# Patient Record
Sex: MALE | Race: WHITE | NOT HISPANIC OR LATINO
[De-identification: names, ages, dates, MRNs, and addresses within clinical notes are randomized per-mention and may not be internally consistent; named-entity substitution may affect disease eponyms.]

---

## 2020-12-11 ENCOUNTER — TRANSCRIPTION ENCOUNTER (OUTPATIENT)
Age: 70
End: 2020-12-11

## 2020-12-12 ENCOUNTER — APPOINTMENT (OUTPATIENT)
Dept: DISASTER EMERGENCY | Facility: HOSPITAL | Age: 70
End: 2020-12-12

## 2020-12-13 ENCOUNTER — TRANSCRIPTION ENCOUNTER (OUTPATIENT)
Age: 70
End: 2020-12-13

## 2020-12-14 PROBLEM — Z00.00 ENCOUNTER FOR PREVENTIVE HEALTH EXAMINATION: Status: ACTIVE | Noted: 2020-12-14

## 2020-12-15 ENCOUNTER — TRANSCRIPTION ENCOUNTER (OUTPATIENT)
Age: 70
End: 2020-12-15

## 2020-12-24 ENCOUNTER — TRANSCRIPTION ENCOUNTER (OUTPATIENT)
Age: 70
End: 2020-12-24

## 2023-08-29 ENCOUNTER — APPOINTMENT (OUTPATIENT)
Dept: HEART AND VASCULAR | Facility: CLINIC | Age: 73
End: 2023-08-29
Payer: MEDICARE

## 2023-08-29 VITALS
SYSTOLIC BLOOD PRESSURE: 150 MMHG | TEMPERATURE: 98 F | HEART RATE: 70 BPM | BODY MASS INDEX: 25.62 KG/M2 | DIASTOLIC BLOOD PRESSURE: 82 MMHG | OXYGEN SATURATION: 98 % | HEIGHT: 71 IN | WEIGHT: 183 LBS

## 2023-08-29 DIAGNOSIS — I47.1 SUPRAVENTRICULAR TACHYCARDIA: ICD-10-CM

## 2023-08-29 PROCEDURE — 99203 OFFICE O/P NEW LOW 30 MIN: CPT

## 2023-08-30 RX ORDER — METOPROLOL SUCCINATE 50 MG/1
50 TABLET, EXTENDED RELEASE ORAL
Refills: 0 | Status: ACTIVE | COMMUNITY

## 2023-08-30 RX ORDER — OMEPRAZOLE 20 MG/1
20 CAPSULE, DELAYED RELEASE ORAL
Refills: 0 | Status: ACTIVE | COMMUNITY

## 2023-08-30 RX ORDER — MULTIVIT-MIN/FOLIC/VIT K/LYCOP 400-300MCG
TABLET ORAL
Refills: 0 | Status: ACTIVE | COMMUNITY

## 2023-08-30 RX ORDER — TADALAFIL 5 MG/1
5 TABLET, FILM COATED ORAL
Refills: 0 | Status: ACTIVE | COMMUNITY

## 2023-08-30 NOTE — REVIEW OF SYSTEMS
[Palpitations] : palpitations [Dizziness] : dizziness [Negative] : Heme/Lymph [Feeling Fatigued] : not feeling fatigued [SOB] : no shortness of breath [Dyspnea on exertion] : not dyspnea during exertion [Chest Discomfort] : no chest discomfort [Lower Ext Edema] : no extremity edema [Leg Claudication] : no intermittent leg claudication [Orthopnea] : no orthopnea [Syncope] : no syncope

## 2023-08-30 NOTE — HISTORY OF PRESENT ILLNESS
[FreeTextEntry1] : 73yoM who is referred for evaluation of SVT.  He reports first noticing symptoms several months ago.  He had an "off feeling" and noticed a fast HR on his smart watch.  He wore an Event monitor 3/2023 which did not show any sustained arrhythmia, but he denied any symptoms while wearing the monitor.  He has since been using the [employer] mobile and documented about 3-4 more episodes, longest lasting 7hrs.  He experiences very mild palpitations, a pulsing sensation in his neck and mild dizziness.  He can normally go about his activities while it occurs.  His cardiologist had prescribed him PRN metoprolol which he reports helped during one episode but the following episode it did not.  He denies any syncope, chest pain, SOB.  He is otherwise active.   Event monitor 3/2023: sinus rhythm w/ occasional PVCs, no sustained ventricular or supraventricular tachyarrhythmias.  Echo 4/4/23: EF 75%, normal regional wall motion, borderline LVH.  Exercise stress test 8/2023: no EKG changes, no symptoms, no ischemic changes.

## 2023-08-30 NOTE — ADDENDUM
[FreeTextEntry1] : I, Nithya Jennings, am scribing for and the presence of Dr. Mary the following sections: HPI, PMH,Family/social history, ROS, Physical Exam, Assessment / Plan.  I, Jim Mary, personally performed the services described in the documentation, reviewed the documentation recorded by the scribe in my presence and it accurately and completely records my words and actions.

## 2023-09-20 PROBLEM — I47.1 SUPRAVENTRICULAR TACHYCARDIA: Status: ACTIVE | Noted: 2023-08-30

## 2024-01-23 ENCOUNTER — TRANSCRIPTION ENCOUNTER (OUTPATIENT)
Age: 74
End: 2024-01-23